# Patient Record
Sex: FEMALE | Race: WHITE
[De-identification: names, ages, dates, MRNs, and addresses within clinical notes are randomized per-mention and may not be internally consistent; named-entity substitution may affect disease eponyms.]

---

## 2020-08-11 ENCOUNTER — HOSPITAL ENCOUNTER (EMERGENCY)
Dept: HOSPITAL 41 - JD.ED | Age: 50
Discharge: HOME | End: 2020-08-11
Payer: COMMERCIAL

## 2020-08-11 DIAGNOSIS — Z79.899: ICD-10-CM

## 2020-08-11 DIAGNOSIS — Z88.5: ICD-10-CM

## 2020-08-11 DIAGNOSIS — K56.609: Primary | ICD-10-CM

## 2020-08-11 DIAGNOSIS — Z88.2: ICD-10-CM

## 2020-08-11 DIAGNOSIS — Z87.19: ICD-10-CM

## 2020-08-11 PROCEDURE — 87635 SARS-COV-2 COVID-19 AMP PRB: CPT

## 2020-08-11 PROCEDURE — 96361 HYDRATE IV INFUSION ADD-ON: CPT

## 2020-08-11 PROCEDURE — 74177 CT ABD & PELVIS W/CONTRAST: CPT

## 2020-08-11 PROCEDURE — 96376 TX/PRO/DX INJ SAME DRUG ADON: CPT

## 2020-08-11 PROCEDURE — U0002 COVID-19 LAB TEST NON-CDC: HCPCS

## 2020-08-11 PROCEDURE — 85025 COMPLETE CBC W/AUTO DIFF WBC: CPT

## 2020-08-11 PROCEDURE — 85652 RBC SED RATE AUTOMATED: CPT

## 2020-08-11 PROCEDURE — 74018 RADEX ABDOMEN 1 VIEW: CPT

## 2020-08-11 PROCEDURE — 96374 THER/PROPH/DIAG INJ IV PUSH: CPT

## 2020-08-11 PROCEDURE — 36415 COLL VENOUS BLD VENIPUNCTURE: CPT

## 2020-08-11 PROCEDURE — 96375 TX/PRO/DX INJ NEW DRUG ADDON: CPT

## 2020-08-11 PROCEDURE — 83690 ASSAY OF LIPASE: CPT

## 2020-08-11 PROCEDURE — 80053 COMPREHEN METABOLIC PANEL: CPT

## 2020-08-11 PROCEDURE — 86140 C-REACTIVE PROTEIN: CPT

## 2020-08-11 PROCEDURE — 99284 EMERGENCY DEPT VISIT MOD MDM: CPT

## 2020-08-11 PROCEDURE — 81001 URINALYSIS AUTO W/SCOPE: CPT

## 2020-08-11 NOTE — EDM.PDOC
<Aquiles Morrison - Last Filed: 08/11/20 06:54>





ED HPI GENERAL MEDICAL PROBLEM





- General


Chief Complaint: Abdominal Pain


Stated Complaint: ABDOMINAL PAIN VOMITING


Time Seen by Provider: 08/11/20 06:44





- History of Present Illness


INITIAL COMMENTS - FREE TEXT/NARRATIVE: 


50-year-old female presents to the emergency room with abdominal pain.





Patient developed severe nausea vomiting and abdominal discomfort over the last 

10 hours or so.  The patient does have an underlying history of Crohn's disease 

which usually does not cause nausea or vomiting for just lots of diarrhea.  Her 

Crohn's has been doing fairly well she is on Humira.  She is not eaten anything 

that she thought might of been suspect.  The other people camping with her have 

eaten the same food and nobody else has become ill.  Not aware of any fevers or 

chills.  Prior to this she has been having several loose stools a day this is 

typical for her with her Crohn's.  She is vomited multiple times through the 

evening but now is just bringing up small amounts of bilious material.





  ** Epigastric


Pain Score (Numeric/FACES): 10





- Related Data


                                    Allergies











Allergy/AdvReac Type Severity Reaction Status Date / Time


 


codeine Allergy  Cannot Verified 08/11/20 06:24





   Remember  


 


Sulfa (Sulfonamide Allergy  Cannot Verified 08/11/20 06:24





Antibiotics)   Remember  











Home Meds: 


                                    Home Meds





Adalimumab [Humira] 1 dose INJECT ASDIRECTED 08/11/20 [History]


Omeprazole Magnesium [Prilosec Otc] 20 mg PO DAILY 08/11/20 [History]











Past Medical History


Gastrointestinal History: Reports: Other (See Below)


Other Gastrointestinal History: Crohn's disease





- Past Surgical History


GI Surgical History: Reports: Other (See Below)


Other GI Surgeries/Procedures: bowel resection





Social & Family History





- Tobacco Use


Smoking Status *Q: Never Smoker


Second Hand Smoke Exposure: No





- Caffeine Use


Caffeine Use: Reports: Coffee





- Recreational Drug Use


Recreational Drug Use: No





ED ROS GENERAL





- Review of Systems


Review Of Systems: See Below


Constitutional: Reports: No Symptoms


HEENT: Reports: No Symptoms


Respiratory: Reports: No Symptoms


Cardiovascular: Reports: No Symptoms


GI/Abdominal: Reports: Abdominal Pain, Diarrhea, Nausea, Vomiting





ED EXAM, GI/ABD





- Physical Exam


Exam: See Below


Exam Limited By: No Limitations


General Appearance: Alert, No Apparent Distress


Head: Atraumatic, Normocephalic


Neck: Normal Inspection, Supple, Non-Tender, Full Range of Motion.  No: 

Lymphadenopathy (L), Lymphadenopathy (R)


Respiratory/Chest: No Respiratory Distress, Lungs Clear, Normal Breath Sounds


Cardiovascular: Regular Rate, Rhythm, No Edema, No Murmur


GI/Abdominal Exam: Normal Bowel Sounds, Soft, Other (She has diffuse tenderness 

with palpation no localizing symptoms no rigidity rebound or guarding)


Back Exam: Normal Inspection.  No: CVA Tenderness (L), CVA Tenderness (R)


Neurological: Alert, Oriented, Normal Cognition





Departure





- Departure


Disposition: Home, Self-Care 01


Clinical Impression: 


 Small bowel obstruction, History of Crohn's disease








- Discharge Information


Instructions:  Bowel Obstruction, Easy-to-Read


Referrals: 


PCP,Not In Area [Primary Care Provider] - 


Forms:  ED Department Discharge


Additional Instructions: 


Evaluation in the emergency room this morning in regards to development of 

diffuse abdominal pain with nausea and vomiting.  History of Crohn's disease 

which has been relatively well controlled with Humira for the last 10 years.  

Test revealed a mildly elevated white blood cell count with a left shift.  No 

major electrolyte abnormalities or abnormalities of the kidneys or liver were 

identified.  There was some evidence of volume depletion and this is secondary 

to fluid accumulation within the bowel and the abdominal cavity.  X-ray of the 

abdomen showed 1 dilated loop of small bowel in the left upper quadrant or mid 

abdomen suggestive of an ileus.  CT of the abdomen was performed with oral and 

IV contrast and reveals evidence of small bowel obstruction with termination of 

the dilated bowel in the right lower quadrant at site of previous surgery.  It 

is possible that the [place] muscles where the bowel was re-joint back together

has narrowed enough to cause small bowel obstruction development.  It is more 

likely to be due to internal scars called adhesions to have caused this problem.

 Decision has been made to travel back to [facility] where you were from 

for definitive medical care versus being admitted to a larger center such as 

[facility] for care at this time.  Suggest only ice chips along the way 

nothing to eat or drink otherwise.  Zofran 4 mg under the tongue every 4 hours 

as needed for relief of nausea or vomiting.  Percocet tabs 5/325 mg 1 or 2 

tablets with Zofran under the tongue 15 minutes prior to prevent vomiting from 

the pain pills.  May take with a sip of water or ice chips.  Just traveling to 

hospital in [facility] as soon as you reach that destination for planned 

admission to the hospital due to small bowel obstruction.  Of course if things 

get out of control in route you can stop at 1 of the major cities such as 

[facility] or [place] for definitive treatment if necessary.





Sepsis Event Note (ED)





- Evaluation


Sepsis Screening Result: No Definite Risk





<Sabino Suarez - Last Filed: 08/11/20 10:35>





ED HPI GENERAL MEDICAL PROBLEM





- History of Present Illness


Onset: Gradual


Onset Date: 08/10/20


Onset Time: 18:00


Duration: Hour(s):, Getting Worse


Location: Reports: Abdomen (Abdominal pain primarily supraumbilical but pressure

along the lower abdomen as well.  Pain has a very strong colicky component.)


Quality: Reports: Ache, Sharp, Stabbing


Severity: Moderate (Sharp and stabbing colicky abdominal pain)


Improves with: Reports: None ( at a 10)


Worsens with: Reports: None


Associated Symptoms: Reports: Fever/Chills, Loss of Appetite, Nausea/Vomiting 

(Chills but no fever).  Denies: Confusion, Chest Pain, Cough, cough w sputum, 

Diaphoresis, Headaches, Malaise, Rash, Seizure, Shortness of Breath, Syncope 

(Bilious emesis), Weakness


Treatments PTA: Reports: Other (see below) (.)





Social & Family History





- Living Situation & Occupation


Living situation: Reports: 


Occupation: Employed


Social History Comment: Only traveling in [place].  They reside in [facility]





Course





- Vital Signs


Last Recorded V/S: 


                                Last Vital Signs











Temp  36.2 C   08/11/20 06:21


 


Pulse  77   08/11/20 06:21


 


Resp  18   08/11/20 06:21


 


BP  145/93 H  08/11/20 06:21


 


Pulse Ox  99   08/11/20 06:21














- Orders/Labs/Meds


Orders: 


                               Active Orders 24 hr











 Category Date Time Status


 


 CORONAVIRUS COVID-19 PCR [facility] Stat Lab  08/11/20 08:30 Received


 


 Dextrose 5%-Lactated Ringers 1,000 ml Med  08/11/20 08:15 Active





 IV ASDIRECTED   


 


 Sodium Chloride 0.9% [Saline Flush] Med  08/11/20 08:49 Active





 10 ml FLUSH ONETIME PRN   








                                Medication Orders





Dextrose/Lactated Ringer's (Dextrose 5%-Lactated Ringers)  1,000 mls @ 150 

mls/hr IV ASDIRECTED MIMI


   Last Admin: 08/11/20 08:36  Dose: 150 mls/hr


   Documented by: HOLLEY


Sodium Chloride (Saline Flush)  10 ml FLUSH ONETIME PRN


   PRN Reason: IV FLUSH


   Last Admin: 08/11/20 09:52  Dose: 10 ml


   Documented by: KALEB








Labs: 


                                Laboratory Tests











  08/11/20 08/11/20 08/11/20 Range/Units





  06:45 06:45 06:45 


 


WBC   11.41 H   (3.98-10.04)  K/mm3


 


RBC   5.24 H   (3.98-5.22)  M/mm3


 


Hgb   16.0 H   (11.2-15.7)  gm/dl


 


Hct   48.3 H   (34.1-44.9)  %


 


MCV   92.2   (79.4-94.8)  fl


 


MCH   30.5   (25.6-32.2)  pg


 


MCHC   33.1   (32.2-35.5)  g/dl


 


RDW Std Deviation   45.3   (36.4-46.3)  fL


 


Plt Count   295   (182-369)  K/mm3


 


MPV   10.0   (9.4-12.3)  fl


 


Neut % (Auto)   91.0 H   (34.0-71.1)  %


 


Lymph % (Auto)   6.6 L   (19.3-51.7)  %


 


Mono % (Auto)   2.1 L   (4.7-12.5)  %


 


Eos % (Auto)   0 L   (0.7-5.8)  


 


Baso % (Auto)   0.2   (0.1-1.2)  %


 


Neut # (Auto)   10.39 H   (1.56-6.13)  K/mm3


 


Lymph # (Auto)   0.75 L   (1.18-3.74)  K/mm3


 


Mono # (Auto)   0.24   (0.24-0.36)  K/mm3


 


Eos # (Auto)   0.00 L   (0.04-0.36)  K/mm3


 


Baso # (Auto)   0.02   (0.01-0.08)  K/mm3


 


Manual Slide Review   Abnormal smear   


 


ESR     (0-20)  mm/hr


 


Sodium    142  (136-145)  mEq/L


 


Potassium    3.9  (3.5-5.1)  mEq/L


 


Chloride    104  ()  mEq/L


 


Carbon Dioxide    25  (21-32)  mEq/L


 


Anion Gap    16.9 H  (5-15)  


 


BUN    14  (7-18)  mg/dL


 


Creatinine    1.0  (0.55-1.02)  mg/dL


 


Est Cr Clr Drug Dosing    58.90  mL/min


 


Estimated GFR (MDRD)    58  (>60)  mL/min


 


BUN/Creatinine Ratio    14.0  (14-18)  


 


Glucose    128 H  ()  mg/dL


 


Calcium    9.9  (8.5-10.1)  mg/dL


 


Total Bilirubin    0.5  (0.2-1.0)  mg/dL


 


AST    22  (15-37)  U/L


 


ALT    30  (14-59)  U/L


 


Alkaline Phosphatase    108  ()  U/L


 


C-Reactive Protein     (<1.0)  mg/dL


 


Total Protein    9.1 H  (6.4-8.2)  g/dl


 


Albumin    4.6  (3.4-5.0)  g/dl


 


Globulin    4.5  gm/dL


 


Albumin/Globulin Ratio    1.0  (1-2)  


 


Lipase    151  ()  U/L


 


Urine Color  Yellow    (Yellow)  


 


Urine Appearance  Slt cloudy H    (Clear)  


 


Urine pH  5.5    (5.0-8.0)  


 


Ur Specific Gravity  > or = 1.030    (1.005-1.030)  


 


Urine Protein  1+ H    (Negative)  


 


Urine Glucose (UA)  Negative    (Negative)  


 


Urine Ketones  2+ H    (Negative)  


 


Urine Occult Blood  1+ H    (Negative)  


 


Urine Nitrite  Negative    (Negative)  


 


Urine Bilirubin  1+ H    (Negative)  


 


Urine Urobilinogen  0.2    (0.2-1.0)  


 


Ur Leukocyte Esterase  Negative    (Negative)  


 


Urine RBC  5-10 H    (0-5)  /hpf


 


Urine WBC  0-5    (0-5)  /hpf


 


Ur Squamous Epith Cells  5-10 H    (0-5)  /hpf


 


Amorphous Sediment  Many H    (NOT SEEN)  /hpf


 


Urine Bacteria  Few    (FEW)  /hpf


 


Urine Mucus  Rare    (FEW)  /hpf














  08/11/20 08/11/20 Range/Units





  06:45 06:45 


 


WBC    (3.98-10.04)  K/mm3


 


RBC    (3.98-5.22)  M/mm3


 


Hgb    (11.2-15.7)  gm/dl


 


Hct    (34.1-44.9)  %


 


MCV    (79.4-94.8)  fl


 


MCH    (25.6-32.2)  pg


 


MCHC    (32.2-35.5)  g/dl


 


RDW Std Deviation    (36.4-46.3)  fL


 


Plt Count    (182-369)  K/mm3


 


MPV    (9.4-12.3)  fl


 


Neut % (Auto)    (34.0-71.1)  %


 


Lymph % (Auto)    (19.3-51.7)  %


 


Mono % (Auto)    (4.7-12.5)  %


 


Eos % (Auto)    (0.7-5.8)  


 


Baso % (Auto)    (0.1-1.2)  %


 


Neut # (Auto)    (1.56-6.13)  K/mm3


 


Lymph # (Auto)    (1.18-3.74)  K/mm3


 


Mono # (Auto)    (0.24-0.36)  K/mm3


 


Eos # (Auto)    (0.04-0.36)  K/mm3


 


Baso # (Auto)    (0.01-0.08)  K/mm3


 


Manual Slide Review    


 


ESR   3  (0-20)  mm/hr


 


Sodium    (136-145)  mEq/L


 


Potassium    (3.5-5.1)  mEq/L


 


Chloride    ()  mEq/L


 


Carbon Dioxide    (21-32)  mEq/L


 


Anion Gap    (5-15)  


 


BUN    (7-18)  mg/dL


 


Creatinine    (0.55-1.02)  mg/dL


 


Est Cr Clr Drug Dosing    mL/min


 


Estimated GFR (MDRD)    (>60)  mL/min


 


BUN/Creatinine Ratio    (14-18)  


 


Glucose    ()  mg/dL


 


Calcium    (8.5-10.1)  mg/dL


 


Total Bilirubin    (0.2-1.0)  mg/dL


 


AST    (15-37)  U/L


 


ALT    (14-59)  U/L


 


Alkaline Phosphatase    ()  U/L


 


C-Reactive Protein  0.4   (<1.0)  mg/dL


 


Total Protein    (6.4-8.2)  g/dl


 


Albumin    (3.4-5.0)  g/dl


 


Globulin    gm/dL


 


Albumin/Globulin Ratio    (1-2)  


 


Lipase    ()  U/L


 


Urine Color    (Yellow)  


 


Urine Appearance    (Clear)  


 


Urine pH    (5.0-8.0)  


 


Ur Specific Gravity    (1.005-1.030)  


 


Urine Protein    (Negative)  


 


Urine Glucose (UA)    (Negative)  


 


Urine Ketones    (Negative)  


 


Urine Occult Blood    (Negative)  


 


Urine Nitrite    (Negative)  


 


Urine Bilirubin    (Negative)  


 


Urine Urobilinogen    (0.2-1.0)  


 


Ur Leukocyte Esterase    (Negative)  


 


Urine RBC    (0-5)  /hpf


 


Urine WBC    (0-5)  /hpf


 


Ur Squamous Epith Cells    (0-5)  /hpf


 


Amorphous Sediment    (NOT SEEN)  /hpf


 


Urine Bacteria    (FEW)  /hpf


 


Urine Mucus    (FEW)  /hpf











Meds: 


Medications











Generic Name Dose Route Start Last Admin





  Trade Name Freq  PRN Reason Stop Dose Admin


 


Dextrose/Lactated Ringer's  1,000 mls @ 150 mls/hr  08/11/20 08:15  08/11/20 

08:36





  Dextrose 5%-Lactated Ringers  IV   150 mls/hr





  ASDIRECTED MIMI   Administration


 


Sodium Chloride  10 ml  08/11/20 08:49  08/11/20 09:52





  Saline Flush  FLUSH   10 ml





  ONETIME PRN   Administration





  IV FLUSH  














Discontinued Medications














Generic Name Dose Route Start Last Admin





  Trade Name Freq  PRN Reason Stop Dose Admin


 


Diatrizoate Meglum/Diatrizoate Sod  120 ml  08/11/20 08:49  08/11/20 09:52





  Gastrografin 37%  PO  08/11/20 08:50  45 ml





  ONETIME ONE   Administration


 


Diphenhydramine HCl  12.5 mg  08/11/20 09:32  08/11/20 09:39





  Benadryl  IVPUSH  08/11/20 09:33  12.5 mg





  ONETIME ONE   Administration


 


Fentanyl  50 mcg  08/11/20 06:55  08/11/20 07:06





  Sublimaze  IVPUSH  08/11/20 06:56  50 mcg





  ONETIME ONE   Administration


 


Hydromorphone HCl  0.5 mg  08/11/20 07:42  08/11/20 07:51





  Dilaudid  IVPUSH  08/11/20 07:43  0.5 mg





  ONETIME ONE   Administration


 


Hydromorphone HCl  0.5 mg  08/11/20 10:21 





  Dilaudid  IVPUSH  08/11/20 10:22 





  ONETIME ONE  


 


Sodium Chloride  1,000 mls @ 999 mls/hr  08/11/20 06:55  08/11/20 07:05





  Normal Saline  IV  08/11/20 07:55  999 mls/hr





  ONETIME ONE   Administration


 


Iopamidol  100 ml  08/11/20 08:49  08/11/20 09:52





  Isovue-300 (61%)  IVPUSH  08/11/20 08:50  100 ml





  ONETIME ONE   Administration


 


Metoclopramide HCl  7.5 mg  08/11/20 09:32  08/11/20 09:39





  Reglan  IVPUSH  08/11/20 09:33  7.5 mg





  ONETIME ONE   Administration


 


Ondansetron HCl  4 mg  08/11/20 06:55  08/11/20 07:05





  Zofran  IVPUSH  08/11/20 06:56  4 mg





  ONETIME ONE   Administration


 


Ondansetron HCl  4 mg  08/11/20 10:21  08/11/20 10:31





  Zofran  IVPUSH  08/11/20 10:22  4 mg





  ONETIME ONE   Administration














- Radiology Interpretation


Free Text/Narrative:: 





50-year-old female presents to the ED with diffuse upper abdominal pain although

she has generalized abdominal pain most of the pain is supraumbilical.  She 

reports pain comes in waves I strong colicky component to the pain.  Associated 

intractable nausea and vomiting of bilious material.  Her bowel movements have 

been more formed up the last 2 weeks prior to that for the last month they have 

been quite loose and diarrhea.  No blood noted.  She was diagnosed with Crohn's 

disease about 10 years ago although she believes she had the disease process for

a good 5 or 6 years before diagnosis was made.  She has had partial small bowel 

resection she believes 18 inches of her ileum was removed.  She has not had a 

colostomy.  No recent changes in any medications.  She has been on Humira 

subcutaneous injections for greater than 5 years with fairly good relief of her 

symptoms.  Associated chills but no defined fever.She does not drink alcohol.  

Care assumed from Dr. Morrison at change of shift.  Still having a good deal of 

upper abdominal pain which comes in a colicky fashion.  Labs are pending.  Plan 

KUB to be done.  Given Dilaudid 0.5 mg IV for further pain relief.  Will likely 

require CT of the abdomen with oral contrast.





- Re-Assessments/Exams


Free Text/Narrative Re-Assessment/Exam: 





08/11/20 07:47 White count is mildly elevated at 11.41.  Differential is 91% 

neutrophils.  Hemoglobin is 16.0 with hematocrit of 48.3 suggesting some degree 

of hemoconcentration.  Platelet count is 295,000.  The micro does not show any 

bands cells.  Sodium 142 with a potassium of 3.9.  Chloride 104 with a bicarb of

25.  Anion gap is elevated at 16.9.  BUN is 14 with a creatinine of 1.0.  

Glucose 128.  Calcium 9.9 liver function is normal.  Total protein is elevated 

at 9.1 albumin fraction is 4.6.  Lipase is normal at 151.  Urinalysis shows 

slightly cloudy urine with 1+ proteinuria and 2+ ketones.  1+ occult blood.  1+ 

bilirubin.  There are 5-10 RBCs per high-power field no white cells identified 

5-10 squamous epithelial cells and many amorphous sediment





08/11/20 08:20 KUB reveals a solitary minimally dilated loop of small bowel left

upper quadrant.  This may be related to possible developing ileus due to 

underlying inflammatory process.  No air-fluid levels to suggest obstruction.  

There is increased stool in the cecum and distal portion of the right hemicolon.

 She has an IUD placed in her uterus.  Its of previous abdominal surgery with 

surgical clips right lower quadrant .





08/11/20 08:35 patient believes she will be able to keep down some contrast and 

is willing to give it a try.  We will try and perform CT of the abdomen and 

pelvis with oral and IV contrast.





08/11/20 09:33 patient is having more nausea since ingesting oral contrast 

material.  We will give her Zofran 7.5 mg IV with Benadryl 12.5 mg IV to prevent

any dystonic reaction with the Zofran she had received previous.





08/11/20 10:21 CT of the abdomen and pelvis has been completed.  Small low-

density lesion is noted anteriorly within the right lobe of the liver which most

likely represents a small liver cyst measuring 1.0 cm in size.  Small higher 

hiatal hernia is appreciated.  Spleen appears to be normal.  Pancreas shows no 

discrete abnormalities.  Adrenal glands show no nodules.  Gallbladder contains 

no calcified gallstones.  Kidneys show symmetric contrast enhancement without 

hydronephrosis or mass.





Mid and distal small bowel is dilated and contains fluid.  Stool is noted within

the terminal ileum.  Previous surgery noted at the ileocecal junction.  There 

may be a focal area of narrowing at the anastomotic site causing the small bowel

dilatation as well as the fecal containing terminal ileum.  Small amount of 

fluid is seen within the pelvis as well as around the liver and spleen which mos

t likely is reactive from the small bowel dilatation or due to adnexal or 

ovarian cyst rupture.  Incidental IUD noted within the uterus.  Delayed images 

show contrast within the distal ureters as well as contrast within the bladder. 

I have discussed  the findings of the CT which confirm developing small bowel 

obstruction with numerous dilated loops of small bowel down into the pelvis 

where appears to terminate.  It is most likely obstructed due to adhesions from 

previous small bowel resection.  There is no obvious thickening of the wall of 

the small bowel to suggest inflammation related to Crohn's disease.  After 

discussion with both her and her  they opted to travel back to [facility] which is an [address] and be hospitalized there versus being 

admitted to [facility] for care.  If things worsen along the way they can

of course stop and the larger center to be admitted.  Copy of the CT will be 

placed on CD ROM.  Copies of chart notes and labs will be sent with the patient.

 Instymed machine utilized for Zofran sublingual 4 mg every 4 hours as necessary

and she may use ice chips only.  Percocet tabs 5 325 mg 1 or 2 every 4-6 hours 

necessary for pain relief if they will stay down.











Departure





- Departure


Time of Disposition: 10:25


Condition: Fair





- Discharge Information


*PRESCRIPTION DRUG MONITORING PROGRAM REVIEWED*: Not Applicable


*COPY OF PRESCRIPTION DRUG MONITORING REPORT IN PATIENT TE: Not Applicable





Sepsis Event Note (ED)





- Focused Exam


Vital Signs: 


                                   Vital Signs











  Temp Pulse Resp BP Pulse Ox


 


 08/11/20 06:21  36.2 C  77  18  145/93 H  99














- My Orders


Last 24 Hours: 


My Active Orders





08/11/20 08:15


Dextrose 5%-Lactated Ringers 1,000 ml IV ASDIRECTED 





08/11/20 08:30


CORONAVIRUS COVID-19 PCR PHL Stat 





08/11/20 08:49


Sodium Chloride 0.9% [Saline Flush]   10 ml FLUSH ONETIME PRN 














- Assessment/Plan


Last 24 Hours: 


My Active Orders





08/11/20 08:15


Dextrose 5%-Lactated Ringers 1,000 ml IV ASDIRECTED 





08/11/20 08:30


CORONAVIRUS COVID-19 PCR PHL Stat 





08/11/20 08:49


Sodium Chloride 0.9% [Saline Flush]   10 ml FLUSH ONETIME PRN

## 2020-08-11 NOTE — CT
CT abdomen and pelvis

 

Technique: Multiple axial sections were obtained from above the dome 

of the diaphragm inferiorly through the pubic symphysis.  Intravenous 

contrast was utilized.  No oral contrast has been given.  Delayed 

images were obtained through the pelvis.

 

Comparison: Prior abdominal x-ray performed earlier on the same day 

(7:57 AM).

 

Findings: Visualized lung bases show nothing acute.

 

Small low-density lesion is noted anteriorly within the right lobe of 

the liver which most likely represents a small liver cyst measuring 

1.0 cm in size.  No additional abnormality is appreciated within the 

liver.  

 

Small hiatal hernia is noted.  

 

Spleen appears within normal limits.  Pancreas shows no discrete 

abnormality.  Adrenal glands contain no nodule.  Gallbladder contains 

no calcified gallstones.  Kidneys show symmetric contrast enhancement 

without hydronephrosis or mass.  

 

Mid and distal small bowel is dilated and contains fluid.  Stool is 

noted within the terminal ileum.  Previous surgery noted at the 

ileocecal junction.  There may be a focal area of narrowing at the 

anastomotic site causing the small bowel dilatation as well as the 

fecal-containing terminal ileum.

 

Small amount of fluid is seen within the pelvis as well as around the 

liver and spleen which most likely is reactive from the small bowel 

dilatation or due to adnexal or ovarian cyst rupture.  

 

No free fluid or inflammatory change is appreciated.

 

Bone window settings were reviewed which show no acute osseous 

finding.

 

Delayed images show contrast within the distal ureters as well as 

contrast within the bladder.

 

Incidental IUD noted within the uterus.

 

Impression:

1.  Mid and distal small bowel dilatation with stool seen within the 

distal terminal ileum.  These findings may relate to an area of 

stenosis at the ileocecal junction an area of prior surgery.  Findings

 could also relate to minimal focal area of Crohn's disease.

2.  Small liver cyst.  IUD.

3.  Slight fluid within the pelvis as well as minimal fluid around the

 liver and spleen.  This may be reactive from the small bowel process 

as well as possibly due to nonvisualized adnexal or ovarian cyst 

rupture.

4.  No other acute finding is seen.

 

Diagnostic code #3

 

This report was dictated in MDT

## 2020-08-11 NOTE — CR
Abdomen: Supine view of the abdomen was obtained.

 

Comparison: No previous study.

 

Single slightly prominent loop of small bowel is noted which may 

relate to a localized ileus.  Surgical material is seen within the 

right abdomen.  IUD is noted within the pelvis.  Bony structures are 

unremarkable.

 

Impression:

1.  Single slightly prominent loop of small bowel possibly relating to

 localized ileus from underlying inflammatory process.

2.  Previous abdominal surgery and IUD.

3.  No additional abnormality is seen.

 

Diagnostic code #3

 

This report was dictated in MDT